# Patient Record
Sex: FEMALE | Race: WHITE | URBAN - NONMETROPOLITAN AREA
[De-identification: names, ages, dates, MRNs, and addresses within clinical notes are randomized per-mention and may not be internally consistent; named-entity substitution may affect disease eponyms.]

---

## 2018-11-28 ENCOUNTER — OPTICAL OFFICE (OUTPATIENT)
Dept: URBAN - NONMETROPOLITAN AREA CLINIC 4 | Facility: CLINIC | Age: 43
Setting detail: OPHTHALMOLOGY
End: 2018-11-28
Payer: COMMERCIAL

## 2018-11-28 ENCOUNTER — DOCTOR'S OFFICE (OUTPATIENT)
Dept: URBAN - NONMETROPOLITAN AREA CLINIC 1 | Facility: CLINIC | Age: 43
Setting detail: OPHTHALMOLOGY
End: 2018-11-28
Payer: COMMERCIAL

## 2018-11-28 DIAGNOSIS — H52.13: ICD-10-CM

## 2018-11-28 DIAGNOSIS — Z01.00: ICD-10-CM

## 2018-11-28 DIAGNOSIS — H52.223: ICD-10-CM

## 2018-11-28 PROCEDURE — 92014 COMPRE OPH EXAM EST PT 1/>: CPT | Performed by: OPTOMETRIST

## 2018-11-28 PROCEDURE — V2020 VISION SVCS FRAMES PURCHASES: HCPCS | Performed by: OPTOMETRIST

## 2018-11-28 PROCEDURE — V2107 SPHEROCYLINDER 4.25D/12-2D: HCPCS | Performed by: OPTOMETRIST

## 2018-11-28 PROCEDURE — V2103 SPHEROCYLINDR 4.00D/12-2.00D: HCPCS | Performed by: OPTOMETRIST

## 2018-11-28 ASSESSMENT — REFRACTION_MANIFEST
OS_VA2: 20/20
OD_VA3: 20/
OS_SPHERE: -4.25
OD_VA1: 20/20
OD_SPHERE: -3.75
OU_VA: 20/
OS_ADD: +1.50
OD_VA3: 20/
OS_VA3: 20/
OD_ADD: +1.50
OD_VA2: 20/
OD_AXIS: 030
OD_VA2: 20/20
OU_VA: 20/
OS_VA1: 20/20
OS_VA3: 20/
OS_VA2: 20/
OD_VA1: 20/
OD_CYLINDER: -0.75
OS_VA1: 20/
OS_AXIS: 150
OS_CYLINDER: -0.50

## 2018-11-28 ASSESSMENT — VISUAL ACUITY
OD_BCVA: 20/25
OS_BCVA: 20/25+2

## 2018-11-28 ASSESSMENT — REFRACTION_CURRENTRX
OS_OVR_VA: 20/
OD_CYLINDER: -0.25
OS_OVR_VA: 20/
OS_SPHERE: -4.25
OS_CYLINDER: SPH
OS_OVR_VA: 20/
OD_OVR_VA: 20/
OD_SPHERE: -3.25
OS_VPRISM_DIRECTION: SV
OD_OVR_VA: 20/
OD_OVR_VA: 20/
OD_VPRISM_DIRECTION: SV
OD_AXIS: 050

## 2018-11-28 ASSESSMENT — SPHEQUIV_DERIVED
OS_SPHEQUIV: -4.5
OD_SPHEQUIV: -5
OS_SPHEQUIV: -5.25
OD_SPHEQUIV: -4.125

## 2018-11-28 ASSESSMENT — REFRACTION_AUTOREFRACTION
OD_AXIS: 007
OS_AXIS: 157
OD_SPHERE: -4.50
OS_CYLINDER: -1.50
OS_SPHERE: -4.50
OD_CYLINDER: -1.00

## 2018-11-28 ASSESSMENT — CONFRONTATIONAL VISUAL FIELD TEST (CVF)
OS_FINDINGS: FULL
OD_FINDINGS: FULL

## 2023-10-03 ENCOUNTER — RX ONLY (RX ONLY)
Age: 48
End: 2023-10-03

## 2023-10-03 ENCOUNTER — DOCTOR'S OFFICE (OUTPATIENT)
Dept: URBAN - NONMETROPOLITAN AREA CLINIC 1 | Facility: CLINIC | Age: 48
Setting detail: OPHTHALMOLOGY
End: 2023-10-03
Payer: COMMERCIAL

## 2023-10-03 DIAGNOSIS — Z01.00: ICD-10-CM

## 2023-10-03 DIAGNOSIS — H52.4: ICD-10-CM

## 2023-10-03 DIAGNOSIS — H52.223: ICD-10-CM

## 2023-10-03 DIAGNOSIS — H52.13: ICD-10-CM

## 2023-10-03 PROCEDURE — 92015 DETERMINE REFRACTIVE STATE: CPT

## 2023-10-03 PROCEDURE — 92004 COMPRE OPH EXAM NEW PT 1/>: CPT

## 2023-10-03 ASSESSMENT — TONOMETRY
OD_IOP_MMHG: 18
OS_IOP_MMHG: 18

## 2023-10-03 ASSESSMENT — REFRACTION_AUTOREFRACTION
OS_CYLINDER: -0.25
OS_AXIS: 109
OD_CYLINDER: -0.75
OD_AXIS: 039
OD_SPHERE: -4.25
OS_SPHERE: -4.75

## 2023-10-03 ASSESSMENT — REFRACTION_MANIFEST
OS_CYLINDER: -0.50
OD_CYLINDER: -0.75
OS_AXIS: 130
OS_VA1: 20/20
OS_VA2: 20/20
OD_VA1: 20/20
OD_SPHERE: -4.50
OD_VA2: 20/20
OS_SPHERE: -4.75
OD_ADD: +1.50
OU_VA: 20/20
OD_AXIS: 035
OS_ADD: +1.50

## 2023-10-03 ASSESSMENT — CONFRONTATIONAL VISUAL FIELD TEST (CVF)
OS_FINDINGS: FULL
OD_FINDINGS: FULL

## 2023-10-03 ASSESSMENT — SPHEQUIV_DERIVED
OD_SPHEQUIV: -4.625
OS_SPHEQUIV: -5
OS_SPHEQUIV: -4.875
OD_SPHEQUIV: -4.875

## 2023-10-03 ASSESSMENT — VISUAL ACUITY
OS_BCVA: 20/25+2
OD_BCVA: 20/20-1

## 2023-10-03 ASSESSMENT — REFRACTION_CURRENTRX
OD_OVR_VA: 20/
OD_AXIS: 035
OS_SPHERE: -5.00
OD_VPRISM_DIRECTION: SV
OS_CYLINDER: -0.75
OD_CYLINDER: -0.75
OS_OVR_VA: 20/
OD_SPHERE: -4.75
OS_AXIS: 138
OS_VPRISM_DIRECTION: SV

## 2023-10-17 ENCOUNTER — APPOINTMENT (EMERGENCY)
Dept: RADIOLOGY | Facility: HOSPITAL | Age: 48
End: 2023-10-17
Payer: COMMERCIAL

## 2023-10-17 ENCOUNTER — HOSPITAL ENCOUNTER (EMERGENCY)
Facility: HOSPITAL | Age: 48
Discharge: HOME | End: 2023-10-17
Attending: EMERGENCY MEDICINE | Admitting: EMERGENCY MEDICINE
Payer: COMMERCIAL

## 2023-10-17 VITALS
TEMPERATURE: 97.3 F | OXYGEN SATURATION: 98 % | SYSTOLIC BLOOD PRESSURE: 143 MMHG | RESPIRATION RATE: 16 BRPM | DIASTOLIC BLOOD PRESSURE: 97 MMHG | HEART RATE: 94 BPM | WEIGHT: 182.8 LBS

## 2023-10-17 DIAGNOSIS — M54.50 LOW BACK PAIN: Primary | ICD-10-CM

## 2023-10-17 PROCEDURE — 72100 X-RAY EXAM L-S SPINE 2/3 VWS: CPT

## 2023-10-17 RX ORDER — OXYBUTYNIN CHLORIDE 5 MG/1
5 TABLET, EXTENDED RELEASE ORAL DAILY
COMMUNITY

## 2023-10-17 RX ORDER — KETOROLAC TROMETHAMINE 30 MG/ML
30 INJECTION, SOLUTION INTRAMUSCULAR; INTRAVENOUS ONCE
Status: COMPLETED | OUTPATIENT
Start: 2023-10-17 | End: 2023-10-17

## 2023-10-17 RX ORDER — LISINOPRIL 2.5 MG/1
2.5 TABLET ORAL DAILY
COMMUNITY

## 2023-10-17 RX ORDER — LEVOTHYROXINE SODIUM 0.12 MG/1
125 TABLET ORAL DAILY
COMMUNITY

## 2023-10-17 RX ADMIN — KETOROLAC TROMETHAMINE 30 MG: 30 INJECTION, SOLUTION INTRAMUSCULAR; INTRAVENOUS at 17:23

## 2023-10-17 NOTE — ED PROVIDER NOTES
History  Chief Complaint   Patient presents with    Back Pain     Patient presents to the ED with complaints of back pain that began last evening. The patient denies injury. 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 Taking?   levothyroxine 125 mcg tablet   Yes Yes   Sig: Take 125 mcg by mouth daily   lisinopril (ZESTRIL) 2.5 mg tablet   Yes Yes   Sig: Take 2.5 mg by mouth daily   oxybutynin (DITROPAN-XL) 5 mg 24 hr tablet   Yes Yes   Sig: Take 5 mg by mouth daily      Facility-Administered Medications: None       History reviewed. No pertinent past medical history. History reviewed. No pertinent surgical history. History reviewed. No pertinent family history. I have reviewed and agree with the history as documented. E-Cigarette/Vaping    E-Cigarette Use Never User      E-Cigarette/Vaping Substances     Social History     Tobacco Use    Smoking status: Every Day     Types: Cigarettes    Smokeless tobacco: Never   Vaping Use    Vaping Use: Never used   Substance Use Topics    Alcohol use: Not Currently    Drug use: Never       Review of Systems   Constitutional: Negative. HENT: Negative. Eyes: Negative. Respiratory: Negative. Cardiovascular: Negative. Gastrointestinal: Negative. Endocrine: Negative.     Genitourinary: Negative. Musculoskeletal:  Positive for back pain. Skin: Negative. Allergic/Immunologic: Negative. Neurological: Negative. Hematological: Negative. Psychiatric/Behavioral: Negative. Physical Exam  Physical Exam  Constitutional:       Appearance: She is well-developed. HENT:      Head: Normocephalic and atraumatic. Eyes:      Conjunctiva/sclera: Conjunctivae normal.      Pupils: Pupils are equal, round, and reactive to light. Cardiovascular:      Rate and Rhythm: Normal rate. Pulmonary:      Effort: Pulmonary effort is normal.   Abdominal:      Palpations: Abdomen is soft. Musculoskeletal:         General: Normal range of motion. Cervical back: Normal range of motion and neck supple. Back:       Comments: Tenderness to palpate in the right lumbar paraspinal musculature   Skin:     General: Skin is warm and dry. Neurological:      Mental Status: She is alert and oriented to person, place, and time.          Vital Signs  ED Triage Vitals [10/17/23 1632]   Temperature Pulse Respirations Blood Pressure SpO2   (!) 97.3 °F (36.3 °C) 94 16 143/97 98 %      Temp Source Heart Rate Source Patient Position - Orthostatic VS BP Location FiO2 (%)   Temporal Monitor Sitting Left arm --      Pain Score       5           Vitals:    10/17/23 1632   BP: 143/97   Pulse: 94   Patient Position - Orthostatic VS: Sitting         Visual Acuity      ED Medications  Medications   ketorolac (TORADOL) injection 30 mg (30 mg Intramuscular Given 10/17/23 1723)       Diagnostic Studies  Results Reviewed       None                   XR spine lumbar 2 or 3 views injury   ED Interpretation by Zachery Beavers DO (10/17 1707)   No acute findings                 Procedures  Procedures         ED Course  ED Course as of 10/17/23 2207   Tue Oct 17, 2023   2206 XR spine lumbar 2 or 3 views injury                               SBIRT 20yo+      Flowsheet Row Most Recent Value   Initial Alcohol Screen: US AUDIT-C     1. How often do you have a drink containing alcohol? 0 Filed at: 10/17/2023 1636   2. How many drinks containing alcohol do you have on a typical day you are drinking? 0 Filed at: 10/17/2023 1636   3b. FEMALE Any Age, or MALE 65+: How often do you have 4 or more drinks on one occassion? 0 Filed at: 10/17/2023 1636   Audit-C Score 0 Filed at: 10/17/2023 1636   JASON: How many times in the past year have you. .. Used an illegal drug or used a prescription medication for non-medical reasons? Never Filed at: 10/17/2023 1636                      Medical Decision Making  Patient presents with low back pain most likely consistent with lumbar radiculopathy. Differential diagnosis includes lumbago versus musculoskeletal spasm/sprain versus sciatica. No back pain red flags on history or physical.  Presentation not consistent with malignancy, fracture, cauda equina syndrome, AAA, viscus perforation, osteomyelitis or epidural abscess, renal colic, pyelonephritis or other infectious process. Given the clinical picture, no indication for further imaging at this time. Problems Addressed:  Low back pain: acute illness or injury    Amount and/or Complexity of Data Reviewed  Radiology: ordered and independent interpretation performed. Decision-making details documented in ED Course. Risk  OTC drugs. Prescription drug management. Disposition  Final diagnoses:   Low back pain     Time reflects when diagnosis was documented in both MDM as applicable and the Disposition within this note       Time User Action Codes Description Comment    10/17/2023  5:44 PM Jose Juan Ortega Add [M54.50] Low back pain           ED Disposition       ED Disposition   Discharge    Condition   Stable    Date/Time   Tue Oct 17, 2023 [facility] discharge to home/self care.                    Follow-up Information    None         Discharge Medication List as of 10/17/2023  5:44 PM        CONTINUE these medications which have NOT CHANGED    Details   levothyroxine 125 mcg tablet Take 125 mcg by mouth daily, Historical Med      lisinopril (ZESTRIL) 2.5 mg tablet Take 2.5 mg by mouth daily, Historical Med      oxybutynin (DITROPAN-XL) 5 mg 24 hr tablet Take 5 mg by mouth daily, Historical Med             No discharge procedures on file.     PDMP Review       None            ED Provider  Electronically Signed by             Rayo Santoyo DO  10/17/23 2256

## 2023-11-01 ENCOUNTER — OFFICE VISIT (OUTPATIENT)
Dept: PODIATRY | Age: 48
End: 2023-11-01
Payer: COMMERCIAL

## 2023-11-01 ENCOUNTER — HOSPITAL ENCOUNTER (OUTPATIENT)
Dept: RADIOLOGY | Facility: CLINIC | Age: 48
Discharge: HOME | End: 2023-11-01
Payer: COMMERCIAL

## 2023-11-01 VITALS
BODY MASS INDEX: 32.25 KG/M2 | HEIGHT: 63 IN | SYSTOLIC BLOOD PRESSURE: 120 MMHG | TEMPERATURE: 97.9 F | HEART RATE: 80 BPM | DIASTOLIC BLOOD PRESSURE: 85 MMHG | WEIGHT: 182 LBS

## 2023-11-01 DIAGNOSIS — M79.671 PAIN IN RIGHT FOOT: ICD-10-CM

## 2023-11-01 DIAGNOSIS — M76.62 ACHILLES TENDINITIS OF LEFT LOWER EXTREMITY: ICD-10-CM

## 2023-11-01 DIAGNOSIS — M79.672 PAIN IN LEFT FOOT: Primary | ICD-10-CM

## 2023-11-01 DIAGNOSIS — M79.671 PAIN OF RIGHT HEEL: ICD-10-CM

## 2023-11-01 DIAGNOSIS — M79.672 PAIN IN LEFT FOOT: ICD-10-CM

## 2023-11-01 PROCEDURE — 73630 X-RAY EXAM OF FOOT: CPT

## 2023-11-01 PROCEDURE — 99204 OFFICE O/P NEW MOD 45 MIN: CPT | Performed by: STUDENT IN AN ORGANIZED HEALTH CARE EDUCATION/TRAINING PROGRAM

## 2023-11-01 RX ORDER — MELOXICAM 7.5 MG/1
7.5 TABLET ORAL DAILY
Qty: 10 TABLET | Refills: 0 | Status: SHIPPED | OUTPATIENT
Start: 2023-11-01

## 2023-11-01 NOTE — PATIENT INSTRUCTIONS
Foot Pain Home Therapy    Stretch. Place painful foot in back with heel on ground and lean against wall. Do not lift heel off of ground. You will feel the stretch in the calf muscles and possibly the heel. Hold the stretch for 20-30 seconds. Do this at least 5-6 times per day. It is best done after exercise when your muscles are warm. Wear supportive shoes at all times. Avoid flip-flops, flat sandals, barefoot walking (never walk barefoot, even at home). Generally avoid shoes that are too flexible and bend in the arch. Your shoes should only slightly bend in the toe area, not the middle. Running sneakers are often the best choice. Open back shoe to left as needed. Supportive sneaker brands: Maikel Lipscomb, On [address] [facility], [facility], [address] [facility], [facility] (discount if mention Dr lauren)  [address]   Supportive daily shoe brands: Vionic, Orthofeet, [place] [employer]olfo, Dansko, [place], [place], [place], [address]  Supportive home shoes: Diamond Adam (recovery slides)*  Purchase over the counter topical pain creams such as Voltarin gel, biofreeze, or CBD cream - will need to apply 2-3 times per day for benefit. + deep tissue massage. Look in to over the counter shoe inserts/arch supports such as *Danharryberg arch supports. These are all available on [facility] as well as their individual website's. [facility]: Vasyli+Dananberg 1st Ray Orthotic, Medium, 1st Ray Function, Medium Density, Full-Length Insole, Heat Molding Optional, Best All Around Orthotic, Functional Biomechanical Control for Pain Relief, Black Yellow (32060) : Health & Household        Achilles Tendon Stretching Exercises    A) Standing Gastrocnemius stretch  Place hands on wall or chair. If using wall, put your hands at eye level. Step the leg you want to stretch behind you. Keep your back heel on the floor and point your toes straight ahead or slightly inward towards the heel of the opposite foot.    Bend your knee toward the wall while keeping your back leg straight. Lean toward the wall until you feel a gentle stretch in you calf of the straight leg. Don't lean so far that you feel pain. Hold for 15 seconds. Complete 3 reps. B) Standing soleus stretch  Place your hands on the wall or chair. If using wall, put your hands at eye level. Step the leg you want to stretch behind you (your back foot will need to be closer to the front foot than the above stretch). Keep your back heel on the floor and point your toes straight ahead or slightly inward towards the heel of the opposite foot. Bend both your front and back knee at the same time (may help to stick your butt out). You do not need to lean towards the wall, just bend the knees. Lean toward the wall until you feel a gentle stretch in you calf of the straight leg. Don't lean so far that you feel pain. Hold for 15 seconds. Complete 3 reps. Keep these tips and tricks in mind to get the most out of your stretching; Take your time - move slowly, whether you are deepening into a stretch or changing positions. This will limit the risk of injury & discomfort. Avoid bouncing - quick sudden movements will only worsen achilles tendon issues. Stay relaxed during stretch. Keep your heel down and toes straight ahead or slightly inward - this will allow the achilles tendon to stretch properly. Stop if you feel pain - Don't strain or force your muscle. If you feel sharp pain, stop immediately.

## 2023-11-01 NOTE — PROGRESS NOTES
Assessment/Plan:       Diagnoses and all orders for this visit:    Pain in left foot  -     XR foot 3+ vw left; Future    Pain in right foot  -     XR foot 3+ vw right; Future    Pain of right heel  -     meloxicam (Mobic) 7.5 mg tablet; Take 1 tablet (7.5 mg total) by mouth daily    Achilles tendinitis of left lower extremity  -     meloxicam (Mobic) 7.5 mg tablet; Take 1 tablet (7.5 mg total) by mouth daily    Other orders  -     Discontinue: levothyroxine (Synthroid) 25 mcg/mL SUSP; Take 25 mcg by mouth daily Pt taking 1/2 tablet daily          Imaging Reviewed at this visit (I personally reviewed/independently interpreted images and reports in PACS)  XR right and left foot WB 6v 11/1/23: No acute osseous abnormalities noted. Slight elevation of 1st metatarsal. Left, small plantar calcaneal spur. Adductovarus 4/5 toes. IMPRESSION:  Right plantar heel and Left posterior heel pain. The differential diagnosis includes plantar fasciitis, Leal's nerve entrapment, intersection syndrome of the FHL and FDL at the knot of Mumtaz, Achilles tendinitis or tendinosis, retrocalcaneal bursitis, Maryam's deformity, degenerative changes (calcaneal spurs, arthrosis of the joints of the foot), and inflammatory conditions of the ligaments and fascia of the foot and ankle. PLAN:  I reviewed clinical exam and radiographic imaging (XR) with patient in detail today. I have discussed with the patient the pathophysiology of this diagnosis and reviewed how the examination correlates with this diagnosis. I reviewed ED note from 10/17/23  I explained at length the biomechanical abnormalities leading to the significant overload of the plantar fascia and achilles tendon. I stressed the importance of proper shoe gear and OTC arch supports to reposition foot to reduce tension on soft tissue structures and give cushion. Instructions were given for conservative care which was also demonstrated during the clinical visit.  I stressed the importance of achilles tendon stretching, icing and applying voltarin gel or biofreeze (OTC) at least 3x/day. C/w HEP as instructed by PT but modify with more AT stretching   I recommend stiff bottomed sneakers/shoes (ex Asics, Vionic, New balance, Delgado, etc) for daily use and Deon Clark (recovery slides) for in home use. I advised pt to obtain OTC orthotics such as Dananberg arch supports  I ordered the addition of PT at this visit to aide in reduction of equinus and also address the plantar fascia and gastroc aponeurosis with graston technique. I rx'd short course of meloxicam today and discussed close monitoring for stomach bleed symptoms which she is to stop the medication immediately if she has such. RTC 2 months      Subjective:      Patient ID: Quiana Warner is a 51 y.o. female. Jose Manuel Krueger presents to clinic today concerning B/L foot pain in poor sneakers in August. Pain to heels and achilles region. Was doing PT, few weeks, now doing HEP. Does have inserts. Notes pain minimal to none with inserts and shoes. Does have pain to back of heels with pressure. Cannot walk barefoot. The following portions of the patient's history were reviewed and updated as appropriate: allergies, current medications, past family history, past medical history, past social history, past surgical history, and problem list.    Review of Systems   Constitutional:  Negative for activity change, chills and fever. HENT: Negative. Respiratory:  Negative for cough, chest tightness and shortness of breath. Cardiovascular:  Negative for chest pain and leg swelling. Endocrine: Negative. Genitourinary: Negative. Neurological: Negative. Negative for numbness. Psychiatric/Behavioral: Negative. Negative for agitation and behavioral problems.           Objective:      /85   Pulse 80   Temp 97.9 °F (36.6 °C)   Ht 5' 3" (1.6 m)   Wt 82.6 kg (182 lb)   BMI 32.24 kg/m²          Physical Exam  Constitutional:       Appearance: Normal appearance. She is not ill-appearing. Cardiovascular:      Pulses: Normal pulses. Comments: Bilateral DP & PT pulses 2/4. CRT WNL. Pedal hair present. Feet warm and well perfused. Pulmonary:      Effort: Pulmonary effort is normal. No respiratory distress. Musculoskeletal:         General: Tenderness (Right foot pain at plantar calcaneus at medial tubercle at insertion of plantar fascia. Left posterior calcaneal pain at insertion of achilles tendon to calc.) present. Normal range of motion. Comments: There is decreased ankle DF with knee extended and flexed indicating gastroc-soleal equinus. B/L WB exam exhibits mild collapse of MLA, heel position slight valgus. Bilateral ankle, STJ, TNJ, TMTJ, MTPJ ROM WNL and without pain. LE muscle strength WNL. Skin:     General: Skin is warm. Capillary Refill: Capillary refill takes less than 2 seconds. Findings: No erythema or lesion. Neurological:      General: No focal deficit present. Mental Status: She is alert and oriented to person, place, and time. Sensory: No sensory deficit. Comments: Gross sensation intact. Denies N/T/B to B/L feet.     Psychiatric:         Mood and Affect: Mood normal.

## 2024-01-27 ENCOUNTER — HOSPITAL ENCOUNTER (EMERGENCY)
Facility: HOSPITAL | Age: 49
Discharge: HOME | End: 2024-01-27
Attending: EMERGENCY MEDICINE
Payer: COMMERCIAL

## 2024-01-27 VITALS
RESPIRATION RATE: 17 BRPM | BODY MASS INDEX: 31.18 KG/M2 | HEIGHT: 63 IN | OXYGEN SATURATION: 98 % | SYSTOLIC BLOOD PRESSURE: 186 MMHG | WEIGHT: 176 LBS | TEMPERATURE: 97.1 F | DIASTOLIC BLOOD PRESSURE: 86 MMHG | HEART RATE: 99 BPM

## 2024-01-27 DIAGNOSIS — M54.50 ACUTE LOW BACK PAIN: Primary | ICD-10-CM

## 2024-01-27 PROCEDURE — 99284 EMERGENCY DEPT VISIT MOD MDM: CPT | Performed by: EMERGENCY MEDICINE

## 2024-01-27 PROCEDURE — 99282 EMERGENCY DEPT VISIT SF MDM: CPT

## 2024-01-27 PROCEDURE — 96372 THER/PROPH/DIAG INJ SC/IM: CPT

## 2024-01-27 RX ORDER — KETOROLAC TROMETHAMINE 30 MG/ML
60 INJECTION, SOLUTION INTRAMUSCULAR; INTRAVENOUS ONCE
Status: COMPLETED | OUTPATIENT
Start: 2024-01-27 | End: 2024-01-27

## 2024-01-27 RX ORDER — OXYCODONE HYDROCHLORIDE AND ACETAMINOPHEN 5; 325 MG/1; MG/1
1 TABLET ORAL EVERY 8 HOURS PRN
Qty: 6 TABLET | Refills: 0 | Status: SHIPPED | OUTPATIENT
Start: 2024-01-27 | End: 2024-01-29

## 2024-01-27 RX ORDER — DEXAMETHASONE SODIUM PHOSPHATE 10 MG/ML
8 INJECTION, SOLUTION INTRAMUSCULAR; INTRAVENOUS ONCE
Status: COMPLETED | OUTPATIENT
Start: 2024-01-27 | End: 2024-01-27

## 2024-01-27 RX ADMIN — KETOROLAC TROMETHAMINE 60 MG: 30 INJECTION, SOLUTION INTRAMUSCULAR; INTRAVENOUS at 14:49

## 2024-01-27 RX ADMIN — DEXAMETHASONE SODIUM PHOSPHATE 8 MG: 10 INJECTION, SOLUTION INTRAMUSCULAR; INTRAVENOUS at 14:50

## 2024-01-27 NOTE — ED PROVIDER NOTES
History  Chief Complaint   Patient presents with   • Back Pain     Pt arrives from home with c/o lower mid back pain x1 month. Pain worsened yesterday. Pt taking prescriptions and going to PT as ordered.      This is a 48-year-old female presenting to the ED for evaluation of lower back pain worsening over the past month.  Patient states that she initially injured her back on 4 January by lifting something heavy.  She was seen initially in the ED and given a shot for pain.  Her pain improved and then came back and so she was seen outpatient by her PCP who put her on a course of steroids, alternating Tylenol/Motrin and recommending physical therapy.  Patient states that she has been taking her medications as prescribed and going to physical therapy which has helped tremendously.  She states she had 3 days of no pain and then yesterday her pain worsened without any heavy lifting or movement.  She states during the course of her last physical therapy session there was an exercise move that caused her to have some pain which was stopped immediately.  She has not had any fever or chills or any bowel or bladder incontinence.  Also denies any urinary symptoms.        Prior to Admission Medications   Prescriptions Last Dose Informant Patient Reported? Taking?   levothyroxine 125 mcg tablet   Yes No   Sig: Take 125 mcg by mouth daily   lisinopril (ZESTRIL) 2.5 mg tablet   Yes No   Sig: Take 2.5 mg by mouth daily   meloxicam (Mobic) 7.5 mg tablet   No No   Sig: Take 1 tablet (7.5 mg total) by mouth daily   oxybutynin (DITROPAN-XL) 5 mg 24 hr tablet   Yes No   Sig: Take 5 mg by mouth daily      Facility-Administered Medications: None       Past Medical History:   Diagnosis Date   • Hypertension    • Thyroid disease        Past Surgical History:   Procedure Laterality Date   • HYSTERECTOMY     • KNEE SURGERY Left    • TONSILLECTOMY         History reviewed. No pertinent family history.  I have reviewed and agree with the  history as documented.    E-Cigarette/Vaping   • E-Cigarette Use Never User      E-Cigarette/Vaping Substances     Social History     Tobacco Use   • Smoking status: Every Day     Types: Cigarettes   • Smokeless tobacco: Never   Vaping Use   • Vaping status: Never Used   Substance Use Topics   • Alcohol use: Not Currently   • Drug use: Never       Review of Systems   Constitutional:  Negative for chills and fever.   HENT:  Negative for ear pain and sore throat.    Eyes:  Negative for pain and visual disturbance.   Respiratory:  Negative for cough and shortness of breath.    Cardiovascular:  Negative for chest pain and palpitations.   Gastrointestinal:  Negative for abdominal pain and vomiting.   Genitourinary:  Negative for dysuria and hematuria.   Musculoskeletal:  Positive for back pain. Negative for arthralgias.   Skin:  Negative for color change and rash.   Neurological:  Negative for seizures and syncope.   All other systems reviewed and are negative.      Physical Exam  Physical Exam  Vitals and nursing note reviewed.   Constitutional:       General: She is in acute distress.      Appearance: Normal appearance. She is well-developed and normal weight.   HENT:      Head: Normocephalic and atraumatic.      Right Ear: External ear normal.      Left Ear: External ear normal.      Nose: Nose normal. No congestion or rhinorrhea.      Mouth/Throat:      Mouth: Mucous membranes are moist.   Eyes:      General:         Right eye: No discharge.         Left eye: No discharge.      Extraocular Movements: Extraocular movements intact.      Conjunctiva/sclera: Conjunctivae normal.   Neck:      Vascular: No carotid bruit.   Cardiovascular:      Rate and Rhythm: Normal rate and regular rhythm.      Pulses: Normal pulses.      Heart sounds: Normal heart sounds. No murmur heard.  Pulmonary:      Effort: Pulmonary effort is normal. No respiratory distress.      Breath sounds: Normal breath sounds. No stridor. No wheezing,  rhonchi or rales.   Chest:      Chest wall: No tenderness.   Abdominal:      General: Abdomen is flat. Bowel sounds are normal. There is no distension.      Palpations: Abdomen is soft. There is no mass.      Tenderness: There is no abdominal tenderness. There is no right CVA tenderness, left CVA tenderness, guarding or rebound.      Hernia: No hernia is present.   Musculoskeletal:         General: No swelling, tenderness, deformity or signs of injury.      Cervical back: Normal range of motion and neck supple. No rigidity or tenderness.      Right lower leg: No edema.      Left lower leg: No edema.      Comments: Decreased range of motion of lumbar spine secondary to pain, no midline tenderness   Skin:     General: Skin is warm and dry.      Capillary Refill: Capillary refill takes less than 2 seconds.      Coloration: Skin is not jaundiced or pale.      Findings: No bruising, erythema, lesion or rash.   Neurological:      General: No focal deficit present.      Mental Status: She is alert and oriented to person, place, and time. Mental status is at baseline.      Cranial Nerves: No cranial nerve deficit.      Sensory: No sensory deficit.      Motor: No weakness.      Coordination: Coordination normal.      Gait: Gait normal.      Deep Tendon Reflexes: Reflexes normal.   Psychiatric:         Mood and Affect: Mood normal.         Behavior: Behavior normal.         Thought Content: Thought content normal.         Vital Signs  ED Triage Vitals   Temperature Pulse Respirations Blood Pressure SpO2   01/27/24 1420 01/27/24 1416 01/27/24 1416 01/27/24 1416 01/27/24 1416   (!) 97.1 °F (36.2 °C) 99 17 (!) 186/86 98 %      Temp Source Heart Rate Source Patient Position - Orthostatic VS BP Location FiO2 (%)   01/27/24 1420 01/27/24 1416 01/27/24 1416 01/27/24 1416 --   Temporal Monitor Sitting Right arm       Pain Score       01/27/24 1416       10 - Worst Possible Pain           Vitals:    01/27/24 1416   BP: (!) 186/86    Pulse: 99   Patient Position - Orthostatic VS: Sitting         Visual Acuity      ED Medications  Medications   ketorolac (TORADOL) injection 60 mg (60 mg Intramuscular Given 1/27/24 1449)   dexamethasone (PF) (DECADRON) injection 8 mg (8 mg Intramuscular Given 1/27/24 1450)       Diagnostic Studies  Results Reviewed       None                   No orders to display              Procedures  Procedures         ED Course  ED Course as of 01/27/24 1602   Sat Jan 27, 2024   1547 Patient feels improved after Toradol and Decadron.  Plan is to discharge patient home with a course of Toradol however given the fact that she is on lisinopril there is adverse reaction with use of both medications.  Patient also states that she was encouraged by her PCP to avoid NSAIDs because of possible adverse outcome on kidneys.  For this reason patient has been given 2-day course of Percocet for pain control however discussed with patient that this is a one-time treatment.  Patient also complained of some residual constipation and was recently on Ultram at the beginning of her back pain.  Possibly drug-induced constipation.  Will discharge her home with a prescription for Colace.                               SBIRT 20yo+      Flowsheet Row Most Recent Value   Initial Alcohol Screen: US AUDIT-C     1. How often do you have a drink containing alcohol? 0 Filed at: 01/27/2024 1422   2. How many drinks containing alcohol do you have on a typical day you are drinking?  0 Filed at: 01/27/2024 1422   3a. Male UNDER 65: How often do you have five or more drinks on one occasion? 0 Filed at: 01/27/2024 1422   3b. FEMALE Any Age, or MALE 65+: How often do you have 4 or more drinks on one occassion? 0 Filed at: 01/27/2024 1422   Audit-C Score 0 Filed at: 01/27/2024 1422   JASON: How many times in the past year have you...    Used an illegal drug or used a prescription medication for non-medical reasons? Never Filed at: 01/27/2024 1422                       Medical Decision Making  48-year-old female with low back pain with a duration of 1 month.  Differential diagnosis includes but not limited to: Sciatica, chronic back pain, spinal stenosis, muscle spasm/strain    Risk  Prescription drug management.             Disposition  Final diagnoses:   Acute low back pain     Time reflects when diagnosis was documented in both MDM as applicable and the Disposition within this note       Time User Action Codes Description Comment    1/27/2024  3:48 PM Carmen Simons [M54.50] Acute low back pain           ED Disposition       ED Disposition   Discharge    Condition   Stable    Date/Time   Sat Jan 27, 2024 6196    Comment   Narcisa Baca discharge to home/self care.                   Follow-up Information       Follow up With Specialties Details Why Contact Info    [place] [id number]  [phone]            Discharge Medication List as of 1/27/2024  3:50 PM        CONTINUE these medications which have NOT CHANGED    Details   levothyroxine 125 mcg tablet Take 125 mcg by mouth daily, Historical Med      lisinopril (ZESTRIL) 2.5 mg tablet Take 2.5 mg by mouth daily, Historical Med      meloxicam (Mobic) 7.5 mg tablet Take 1 tablet (7.5 mg total) by mouth daily, Starting Wed 11/1/2023, Normal      oxybutynin (DITROPAN-XL) 5 mg 24 hr tablet Take 5 mg by mouth daily, Historical Med             No discharge procedures on file.    PDMP Review       None            ED Provider  Electronically Signed by             Carmen Simons DO  01/27/24 5148

## 2024-01-27 NOTE — DISCHARGE INSTRUCTIONS
Return to the ER immediately for any worsening symptoms.  Follow-up with your PCP for further evaluation and management.  Recommend no heavy lifting

## 2024-08-06 RX ORDER — BACLOFEN 20 MG
500 TABLET ORAL
COMMUNITY

## 2024-08-06 RX ORDER — CYCLOBENZAPRINE HCL 10 MG
10 TABLET ORAL 3 TIMES DAILY PRN
COMMUNITY
Start: 2024-01-04

## 2024-08-06 RX ORDER — ALBUTEROL SULFATE 90 UG/1
2 AEROSOL, METERED RESPIRATORY (INHALATION) EVERY 4 HOURS PRN
COMMUNITY

## 2024-08-06 RX ORDER — ESTRADIOL 0.1 MG/G
2 CREAM VAGINAL
COMMUNITY
Start: 2023-11-06

## 2024-08-06 RX ORDER — ACETAMINOPHEN AND CODEINE PHOSPHATE 300; 30 MG/1; MG/1
1-2 TABLET ORAL EVERY 6 HOURS PRN
COMMUNITY
Start: 2024-03-20

## 2024-08-07 ENCOUNTER — OFFICE VISIT (OUTPATIENT)
Dept: PODIATRY | Age: 49
End: 2024-08-07
Payer: COMMERCIAL

## 2024-08-07 VITALS
HEIGHT: 63 IN | OXYGEN SATURATION: 98 % | BODY MASS INDEX: 34.16 KG/M2 | DIASTOLIC BLOOD PRESSURE: 80 MMHG | TEMPERATURE: 98.1 F | SYSTOLIC BLOOD PRESSURE: 118 MMHG | WEIGHT: 192.8 LBS | HEART RATE: 79 BPM

## 2024-08-07 DIAGNOSIS — M79.671 PAIN OF RIGHT HEEL: Primary | ICD-10-CM

## 2024-08-07 DIAGNOSIS — G89.29 CHRONIC LOW BACK PAIN, UNSPECIFIED BACK PAIN LATERALITY, UNSPECIFIED WHETHER SCIATICA PRESENT: ICD-10-CM

## 2024-08-07 DIAGNOSIS — M79.672 PAIN OF LEFT HEEL: ICD-10-CM

## 2024-08-07 DIAGNOSIS — M54.50 CHRONIC LOW BACK PAIN, UNSPECIFIED BACK PAIN LATERALITY, UNSPECIFIED WHETHER SCIATICA PRESENT: ICD-10-CM

## 2024-08-07 PROCEDURE — 99213 OFFICE O/P EST LOW 20 MIN: CPT | Performed by: STUDENT IN AN ORGANIZED HEALTH CARE EDUCATION/TRAINING PROGRAM

## 2024-08-07 RX ORDER — ACETAMINOPHEN 160 MG
2000 TABLET,DISINTEGRATING ORAL DAILY
COMMUNITY
Start: 2024-05-12

## 2024-08-07 RX ORDER — TRIAMCINOLONE ACETONIDE 1 MG/G
OINTMENT TOPICAL
COMMUNITY
Start: 2024-06-05

## 2024-08-07 RX ORDER — B-COMPLEX WITH VITAMIN C
1 TABLET ORAL DAILY
COMMUNITY
Start: 2024-08-01

## 2024-08-07 NOTE — PROGRESS NOTES
Assessment/Plan:       Diagnoses and all orders for this visit:    Pain of right heel    Pain of left heel    Chronic low back pain, unspecified back pain laterality, unspecified whether sciatica present  -     Ambulatory referral to Spine & Pain Management; Future    Other orders  -     estradiol (ESTRACE) 0.1 mg/g vaginal cream; Insert 2 g into the vagina  -     cyclobenzaprine (FLEXERIL) 10 mg tablet; Take 10 mg by mouth Three times daily as needed  -     acetaminophen-codeine (TYLENOL with CODEINE #3) 300-30 MG per tablet; Take 1-2 tablets by mouth every 6 (six) hours as needed  -     albuterol (PROVENTIL HFA,VENTOLIN HFA) 90 mcg/act inhaler; Inhale 2 puffs every 4 (four) hours as needed  -     Magnesium Oxide -Mg Supplement 500 MG TABS; Take 500 mg by mouth  -     B Complex Vitamins (Vitamin B Complex) TABS; Take 1 tablet by mouth daily  -     Cholecalciferol (Vitamin D3) 50 MCG (2000 UT) capsule; Take 2,000 Units by mouth daily  -     triamcinolone (KENALOG) 0.1 % ointment; Apply topically            Prior Imaging   XR right and left foot WB 6v 11/1/23: No acute osseous abnormalities noted. Slight elevation of 1st metatarsal. Left, small plantar calcaneal spur. Adductovarus 4/5 toes.       IMPRESSION:  B/L heel pain. The differential diagnosis includes plantar fasciitis, Leal's nerve entrapment, intersection syndrome of the FHL and FDL at the knot of Mumtaz, Achilles tendinitis or tendinosis, retrocalcaneal bursitis, Maryam's deformity, degenerative changes (calcaneal spurs, arthrosis of the joints of the foot), and inflammatory conditions of the ligaments and fascia of the foot and ankle.   Low back pain, chronic     PLAN:  Right plantar and left posterior heel pain had resolved. Now has Right posterior and left plantar heel pain as she had completed stopped stretching. She did not get new shoes or arch supports as recommended.   I again stressed the importance of daily achilles tendon stretching, proper  "shoe gear and OTC arch supports to reposition foot to reduce tension on soft tissue structures and give cushion.  I again recommend stiff bottomed sneakers/shoes (ex Asics, Vionic, New balance, Delgado, etc) for daily use and Deon Clark (recovery slides) for in home use.   I again advised pt to obtain OTC orthotics such as Dananberg arch supports  I ordered pain mngmt consult due to chronic lower back pain  F/u PRN      Subjective:      Patient ID: Narcisa Baca is a 49 y.o. female.    Narcisa presents to clinic today concerning B/L foot pain f/u. Pain resolved with stretching however she stopped and it has returned on and off. Now to right posterior and left plantar heel. Did not get new shoes nor arch supports as recommended. Has not started stretching again. Lost handout that was given.     \"Pain to heels and achilles region. Was doing PT, few weeks, now doing HEP. Does have inserts. Notes pain minimal to none with inserts and shoes. Does have pain to back of heels with pressure. Cannot walk barefoot.\"         The following portions of the patient's history were reviewed and updated as appropriate: allergies, current medications, past family history, past medical history, past social history, past surgical history, and problem list.    Review of Systems   Constitutional:  Negative for activity change, chills and fever.   HENT: Negative.     Respiratory:  Negative for cough, chest tightness and shortness of breath.    Cardiovascular:  Negative for chest pain and leg swelling.   Endocrine: Negative.    Genitourinary: Negative.    Musculoskeletal:  Positive for back pain.   Neurological: Negative.  Negative for numbness.   Psychiatric/Behavioral: Negative.  Negative for agitation and behavioral problems.          Objective:      /80   Pulse 79   Temp 98.1 °F (36.7 °C) (Temporal)   Ht 5' 3\" (1.6 m)   Wt 87.5 kg (192 lb 12.8 oz)   SpO2 98%   BMI 34.15 kg/m²          Physical Exam  Constitutional:       " Appearance: Normal appearance. She is not ill-appearing.   Cardiovascular:      Pulses: Normal pulses.      Comments: Bilateral DP & PT pulses 2/4. CRT WNL. Pedal hair present. Feet warm and well perfused.   Pulmonary:      Effort: Pulmonary effort is normal. No respiratory distress.   Musculoskeletal:         General: Tenderness (Left foot pain at plantar calcaneus at medial tubercle at insertion of plantar fascia. Right posterior calcaneal pain at insertion of achilles tendon to calc.) present. Normal range of motion.      Comments: There is decreased ankle DF with knee extended and flexed indicating gastroc-soleal equinus.   B/L WB exam exhibits mild collapse of MLA, heel position slight valgus.  Bilateral ankle, STJ, TNJ, TMTJ, MTPJ ROM WNL and without pain. LE muscle strength WNL.   Skin:     General: Skin is warm.      Capillary Refill: Capillary refill takes less than 2 seconds.      Findings: No erythema or lesion.   Neurological:      General: No focal deficit present.      Mental Status: She is alert and oriented to person, place, and time.      Sensory: No sensory deficit.      Comments: Gross sensation intact. Denies N/T/B to B/L feet.    Psychiatric:         Mood and Affect: Mood normal.

## 2024-08-07 NOTE — PATIENT INSTRUCTIONS
Foot Pain Home Therapy    Stretch. Place painful foot in back with heel on ground and lean against wall. Do not lift heel off of ground. You will feel the stretch in the calf muscles and possibly the heel. Hold the stretch for 20-30 seconds. Do this at least 5-6 times per day. It is best done after exercise when your muscles are warm.  Wear supportive shoes at all times. Avoid flip-flops, flat sandals, barefoot walking (never walk barefoot, even at home). Generally avoid shoes that are too flexible and bend in the arch. Your shoes should only slightly bend in the toe area, not the middle. Running sneakers are often the best choice. Open back shoe to left as needed.   Supportive sneaker brands: Delgado, On Cloud, Deon, New Balance, Asics, Mizuno  [place] [employer] (discount if mention Dr lauren)  Fleet Feet [place]  [facility], [place]   Supportive daily shoe brands: Vionic, Orthofeet, Liv, Dansko, Chacos, Og, Teva, Birkenstock  Supportive home shoes: Deon Clark (recovery slides)*  Purchase over the counter topical pain creams such as Voltarin gel, biofreeze, or CBD cream - will need to apply 2-3 times per day for benefit. + deep tissue massage.   Look in to over the counter shoe inserts/arch supports such as Dananberg arch supports (take tab under 1st toe). These are all available on [employer].com as well as their individual website's.   [place]: Vasyli+Dananberg 1st Ray Orthotic, Medium, 1st Ray Function, Medium Density, Full-Length Insole, Heat Molding Optional, Best All Around Orthotic, Functional Biomechanical Control for Pain Relief, Black Yellow ([id number]) : Health & Household        Achilles Tendon Stretching Exercises    A) Standing Gastrocnemius stretch  Place hands on wall or chair. If using wall, put your hands at eye level.  Step the leg you want to stretch behind you. Keep your back heel on the floor and point your toes straight ahead or slightly inward towards the heel of the opposite foot.    Bend your knee toward the wall while keeping your back leg straight.  Lean toward the wall until you feel a gentle stretch in you calf of the straight leg. Don't lean so far that you feel pain.  Hold for 15 seconds. Complete 3 reps.    B) Standing soleus stretch  Place your hands on the wall or chair. If using wall, put your hands at eye level.  Step the leg you want to stretch behind you (your back foot will need to be closer to the front foot than the above stretch). Keep your back heel on the floor and point your toes straight ahead or slightly inward towards the heel of the opposite foot.   Bend both your front and back knee at the same time (may help to stick your butt out). You do not need to lean towards the wall, just bend the knees.   Lean toward the wall until you feel a gentle stretch in you calf of the straight leg. Don't lean so far that you feel pain.  Hold for 15 seconds. Complete 3 reps.          Keep these tips and tricks in mind to get the most out of your stretching;  Take your time - move slowly, whether you are deepening into a stretch or changing positions. This will limit the risk of injury & discomfort.  Avoid bouncing - quick sudden movements will only worsen achilles tendon issues. Stay relaxed during stretch.  Keep your heel down and toes straight ahead or slightly inward - this will allow the achilles tendon to stretch properly.   Stop if you feel pain - Don't strain or force your muscle. If you feel sharp pain, stop immediately.

## 2024-09-11 RX ORDER — LISINOPRIL 10 MG/1
TABLET ORAL
COMMUNITY
Start: 2024-08-01

## 2024-09-11 RX ORDER — MECOBALAMIN 5000 MCG
TABLET,DISINTEGRATING ORAL
COMMUNITY

## 2024-09-13 ENCOUNTER — CONSULT (OUTPATIENT)
Dept: PAIN MEDICINE | Facility: CLINIC | Age: 49
End: 2024-09-13
Payer: COMMERCIAL

## 2024-09-13 VITALS
BODY MASS INDEX: 33.84 KG/M2 | WEIGHT: 191 LBS | RESPIRATION RATE: 18 BRPM | TEMPERATURE: 98.1 F | SYSTOLIC BLOOD PRESSURE: 126 MMHG | OXYGEN SATURATION: 96 % | HEIGHT: 63 IN | HEART RATE: 99 BPM | DIASTOLIC BLOOD PRESSURE: 82 MMHG

## 2024-09-13 DIAGNOSIS — M54.50 CHRONIC LOW BACK PAIN, UNSPECIFIED BACK PAIN LATERALITY, UNSPECIFIED WHETHER SCIATICA PRESENT: ICD-10-CM

## 2024-09-13 DIAGNOSIS — G89.29 CHRONIC LOW BACK PAIN, UNSPECIFIED BACK PAIN LATERALITY, UNSPECIFIED WHETHER SCIATICA PRESENT: ICD-10-CM

## 2024-09-13 DIAGNOSIS — M47.816 LUMBAR SPONDYLOSIS: Primary | ICD-10-CM

## 2024-09-13 PROCEDURE — 99204 OFFICE O/P NEW MOD 45 MIN: CPT | Performed by: ANESTHESIOLOGY

## 2024-09-13 RX ORDER — LEVOTHYROXINE SODIUM 88 UG/1
88 TABLET ORAL DAILY
COMMUNITY
Start: 2024-09-06

## 2024-09-13 NOTE — PROGRESS NOTES
Assessment  1. Chronic low back pain, unspecified back pain laterality, unspecified whether sciatica present  -     Ambulatory referral to Spine & Pain Management    Axial low back pain described primarily by arthritic features.  Aching, nagging, indolent, stabbing, throbbing features in axial low back without radicular components.  5/5 strength bilaterally in lower extremities, negative SLR.  Positive facet loading maneuvers in lumbar spine elicited pain, positive tenderness to palpation over lumbar paraspinal muscles.  Findings correlate with disc degeneration, lumbar facet arthropathy seen throughout axial low back on imaging studies.  Currently she is neurologically intact without gait instability, saddle anesthesia or bowel/bladder abnormality. Lifestyle modifications extensively discussed including diet, core exercises and weight loss in conjunction with smoking cessation. Risks, benefits and alternatives to medial branch blocks and subsequent radiofrequency ablation if successful thoroughly discussed with patient.  Handouts provided questions answered to patient satisfaction.    The patient has been experiencing moderate to severe axial spine pain that is causing functional deficit.  The pain has been present for at least 3 months and is not improving with conservative care.  Currently the patient is not experiencing any radicular features nor neurogenic claudication.  Non-facet pathology has been ruled out on clinical evaluation.    Plan  -MRI lumbar spine; will f/u result  -gabapentin 300 mg t.i.d. Ordered for patient; counseled regarding sedative effects of taking this medication and provided up titration calendar.  Counseled not to take medication while driving or operating heavy machinery/using stairs  -has completed formal physical therapy for lumbar spondylosis; Physician directed home exercise plan as per [employer] demonstrated and handouts provided that patient plans to participate with for 1 hour,  twice a week for the next 6 weeks.     There are risks associated with opioid medications, including dependence, addiction and tolerance. The patient understands and agrees to use these medications only as prescribed. Potential side effects of the medications include, but are not limited to, constipation, drowsiness, addiction, impaired judgment and risk of fatal overdose if not taken as prescribed. The patient was warned against driving while taking sedation medications or operating heavy machinery. The patient voiced understanding. Sharing medications is a felony. At this point in time, the patient is showing no signs of addiction, abuse, diversion or suicidal ideation.     [place] Prescription Drug Monitoring Program report was reviewed and was appropriate      Complete risks and benefits including bleeding, infection, tissue reaction, nerve injury and allergic reaction were discussed. The approach was demonstrated using models and literature was provided. Verbal and written consent was obtained.     My impressions and treatment recommendations were discussed in detail with the patient who verbalized understanding and had no further questions.  Discharge instructions were provided. I personally saw and examined the patient and I agree with the above discussed plan of care.    Review of external notes including PT notes, PCP notes and specialist notes was performed at this visit in addition to review of new ordered imaging and past imaging to develop or modify multidisciplinary pain plan    New Medications Ordered This Visit   Medications    Cyanocobalamin 500 MCG SUBL     Sig: Place 500 mcg under the tongue daily    Melatonin 5 MG TBDP     Sig: Take by mouth    Multiple Vitamins-Minerals (Multivitamin Adults) TABS     Sig: Take by mouth    lisinopril (ZESTRIL) 10 mg tablet    levothyroxine 88 mcg tablet     Sig: Take 88 mcg by mouth daily       History of Present Illness    Narcisa Baca is a 49 y.o.  female with pmhx of HTN, hypothyroidism, obesity, 1PPD smoker (COPD) presenting with a past medical history of chronic low back pain described primarily as arthritic in nature.  She describes 8/10 low back pain that is worse in the mornings and worse at the end of the day.  The pain is characterized by achy, nagging, indolent, crampy, stabbing pain in her axial low back.  The patient describes that the pain is worse with standing for long periods of time on hard surfaces as well as with walking.  The patient is a very active individual and feels as though this pain compromises his participation with independent activities of daily living. The pain can be debilitating at times and contribute to significant disability, compromising overall activity and independent activities of daily living.  She has completed physical therapy with limited relief of symptoms.  Medications the patient has tried in the past include nsaids, tylenol. She describes no radicular symptoms and has good strength.  She denies any weakness numbness or paresthesias. The patient denies any bowel or bladder incontinence, saddle anesthesia or gait instability as well.      I have personally reviewed and/or updated the patient's past medical history, past surgical history, family history, social history, current medications, allergies, and vital signs today.     Review of Systems   Constitutional:  Positive for activity change.   HENT: Negative.     Eyes: Negative.    Respiratory: Negative.     Cardiovascular: Negative.    Gastrointestinal: Negative.    Endocrine: Negative.    Genitourinary: Negative.    Musculoskeletal:  Positive for arthralgias, back pain, gait problem and myalgias.   Skin: Negative.    Allergic/Immunologic: Negative.    Neurological:  Negative for weakness and numbness.   Hematological: Negative.    Psychiatric/Behavioral: Negative.     All other systems reviewed and are negative.      There is no problem list on file for this  patient.      Past Medical History:   Diagnosis Date    Hypertension     Thyroid disease        Past Surgical History:   Procedure Laterality Date    HYSTERECTOMY      KNEE SURGERY Left     TONSILLECTOMY         History reviewed. No pertinent family history.    Social History     Occupational History    Not on file   Tobacco Use    Smoking status: Every Day     Current packs/day: 1.00     Types: Cigarettes    Smokeless tobacco: Never   Vaping Use    Vaping status: Never Used   Substance and Sexual Activity    Alcohol use: Not Currently    Drug use: Never    Sexual activity: Not on file       Current Outpatient Medications on File Prior to Visit   Medication Sig    albuterol (PROVENTIL HFA,VENTOLIN HFA) 90 mcg/act inhaler Inhale 2 puffs every 4 (four) hours as needed PRN    B Complex Vitamins (Vitamin B Complex) TABS Take 1 tablet by mouth daily    Cholecalciferol (Vitamin D3) 50 MCG (2000 UT) capsule Take 2,000 Units by mouth daily    Cyanocobalamin 500 MCG SUBL Place 500 mcg under the tongue daily    levothyroxine 88 mcg tablet Take 88 mcg by mouth daily    lisinopril (ZESTRIL) 10 mg tablet     Melatonin 5 MG TBDP Take by mouth    Multiple Vitamins-Minerals (Multivitamin Adults) TABS Take by mouth    oxybutynin (DITROPAN-XL) 5 mg 24 hr tablet Take 5 mg by mouth daily    triamcinolone (KENALOG) 0.1 % ointment Apply topically PRN    [DISCONTINUED] acetaminophen-codeine (TYLENOL with CODEINE #3) 300-30 MG per tablet Take 1-2 tablets by mouth every 6 (six) hours as needed (Patient not taking: Reported on 9/13/2024)    [DISCONTINUED] cyclobenzaprine (FLEXERIL) 10 mg tablet Take 10 mg by mouth Three times daily as needed (Patient not taking: Reported on 9/13/2024)    [DISCONTINUED] estradiol (ESTRACE) 0.1 mg/g vaginal cream Insert 2 g into the vagina (Patient not taking: Reported on 9/13/2024)    [DISCONTINUED] levothyroxine 125 mcg tablet Take 125 mcg by mouth daily (Patient not taking: Reported on 9/13/2024)     "[DISCONTINUED] lisinopril (ZESTRIL) 2.5 mg tablet Take 2.5 mg by mouth daily (Patient not taking: Reported on 9/13/2024)    [DISCONTINUED] Magnesium Oxide -Mg Supplement 500 MG TABS Take 500 mg by mouth (Patient not taking: Reported on 9/13/2024)    [DISCONTINUED] meloxicam (Mobic) 7.5 mg tablet Take 1 tablet (7.5 mg total) by mouth daily (Patient not taking: Reported on 9/13/2024)     No current facility-administered medications on file prior to visit.       Allergies   Allergen Reactions    Aspirin Other (See Comments)    Milk (Cow) Diarrhea    Nsaids Other (See Comments)     Bowel issues    Amoxicillin Other (See Comments) and Rash         Physical Exam    /82 (BP Location: Left arm, Patient Position: Sitting, Cuff Size: Adult)   Pulse 99   Temp 98.1 °F (36.7 °C)   Resp 18   Ht 5' 3\" (1.6 m)   Wt 86.6 kg (191 lb)   SpO2 96%   BMI 33.83 kg/m²     Constitutional: normal, well developed, well nourished, alert, in no distress and non-toxic and no overt pain behavior. and obese  Eyes: anicteric  HEENT: grossly intact  Neck: supple, symmetric, trachea midline and no masses   Pulmonary:even and unlabored  Cardiovascular:No edema or pitting edema present  Skin:Normal without rashes or lesions and well hydrated  Psychiatric:Mood and affect appropriate  Neurologic:Cranial Nerves II-XII grossly intact Sensation grossly intact; no clonus negative kendrick's. Reflexes 2+ and brisk. SLR negative bilaterally.   Musculoskeletal:normal gait. 5/5 strength bilaterally with AROM in all extremities. Normal heel toe and tip toe walking. Significant pain with lumbar facet loading bilaterally and with lateral spine rotatio, ttp over lumbar paraspinal muscles. Negative negrito's test, negative gaenslen's negative SIJ loading bilaterally.    Imaging    XR SPINE LUMBAR MINIMUM 4 VIEWS NON INJURY  Order: [id number]  Impression    IMPRESSION: No evidence of an acute or suspicious process in the lumbar spine.  Minimal degenerative " disc disease at L4-5 and L5-S1. Postsurgical changes in the  pelvis as noted.          Workstation:[id number]  Narrative    Examination: Five-view lumbar spine    Comparisons: Lumbar spine x-rays dated 5/25/2021.    INDICATION: Low back pain.    FINDINGS: 5 views of the lumbar spine demonstrate 5 lumbar-type vertebral bodies  which are symmetric in height and normally aligned. The endplates and posterior  elements appear intact throughout the lower thoracic and lumbar spine without  evidence of a fracture or suspicious osseous lesion. There is minimal  degenerative disc disease at L4-5 and L5-S1. No other arthritic changes are  identified. The visualized pelvis and hips are intact. The hip joints and SI  joints are unremarkable. Evaluation of the soft tissues again demonstrates  surgical clips in the pelvis consistent with a history of a hysterectomy.

## 2024-09-13 NOTE — PATIENT INSTRUCTIONS
Patient Education     Core Strengthening Exercises on Back or on Hands and Knees   About this topic   Your core muscles are in your chest, back, buttock, and stomach area. They are your abdominal, back, and pelvis muscles. These muscles help keep your body stable when using your arms or legs. They also help with balance and posture. There are many exercises you can do to keep these muscles strong.  If you have back problems like a compression fracture or a ruptured disc, doing some of these exercises could make your problem worse. Some of these exercises may cause lower back pain.  General   Before starting with a program, ask your doctor if you are healthy enough to do these exercises. Your doctor may have you work with a , chiropractor, or physical therapist to make a safe exercise program to meet your needs.  Strengthening Exercises   Strengthening exercises keep your muscles firm and strong. Start by repeating each exercise 2 to 3 times. Work up to doing each exercise 10 times. Try to do the exercises 2 to 3 times each day. Hold each exercise for 3 to 5 seconds. Do all exercises slowly.  Hip lifts ? Lie on your back with your knees bent and feet flat on the floor. Tighten your stomach muscles and push your heels into the floor to lift your buttocks off the floor. Relax.  Pelvic tilts ? Lie on your back with your knees bent and feet flat on the floor. Tighten your stomach muscles and press your lower back down to the floor. Relax.  Straight leg raises lying down ? Lie on your back with one leg straight. Bend your other knee so the foot is flat on the bed. Keeping your leg straight, lift the leg up to the level of your other knee. Lower it back down. Repeat with the other leg.  Knee flex lying down ? Lie on your back with both knees bent and your feet flat on the floor. Tighten your belly muscles. Raise one leg up and back down as if you are marching in slow motion. Keep belly muscles tight while you move  your leg. Switch legs. To make this exercise harder, raise both arms straight up in the air. Tighten your belly muscles. When you raise one leg up, reach the opposite arm over your head. Switch, moving the opposite arm and leg until you have done 10 repetitions on each side.  Abdominal crunches ? Lie on your back with both knees bent. Keep your feet flat on the floor. Place your hands in one of these positions. Try starting with the first position since it is the easiest. As you get better, use the other positions to make it harder.  Crunches with arms at sides.  Crunches with arms across chest.  Crunches with arms behind head. Be careful not to interlock your fingers behind your neck or head while doing crunches. This may add tension to your neck and cause strain.  Look at the ceiling. Tighten your belly muscles and lift your shoulders and upper back off the floor. Breathe out while you are doing this. Lower your shoulders to the floor. Breathe in while you are doing this. Relax your belly muscles all the way before starting another crunch.  Arm and leg lifts on hands and knees ? Start on your hands and knees. With all of these exercises, keep your back as level as possible. If you are having trouble with this, you may want to put a small object on your back such as a book. If it falls off, you are not keeping your back level enough during the exercise.  Lift one arm up to shoulder level and hold. Lower it back down. Now, lift up the other arm and hold.  Lift one leg up and kick it straight out until it is in line with your back and hold. Lower it back down. Now, lift up the other leg and hold.  Lift one arm and the OPPOSITE leg up at the same time and hold. Lower them down. Now, repeat using the other arm and leg. This is a very hard exercise. It may take time to be able to do this.               What will the results be?   Stronger core  Better balance  More toned belly and back muscles  Easier to do daily  activities  Better sports performance  Less low back pain  Helpful tips   Stay active and work out to keep your muscles strong and flexible.  Keep a healthy weight to avoid putting too much stress on your spine. Eat a healthy diet to keep your muscles healthy.  Be sure you do not hold your breath when exercising. This can raise your blood pressure. If you tend to hold your breath, try counting out loud when exercising. If any exercise bothers you, stop right away.  Try walking or cycling at an easy pace for a few minutes to warm up your muscles. Do this again after exercising.  Exercise may be slightly uncomfortable, but you should not have sharp pains. If you do get sharp pains, stop what you are doing. If the sharp pains continue, call your doctor.  Last Reviewed Date   2021-03-18  Consumer Information Use and Disclaimer   This generalized information is a limited summary of diagnosis, treatment, and/or medication information. It is not meant to be comprehensive and should be used as a tool to help the user understand and/or assess potential diagnostic and treatment options. It does NOT include all information about conditions, treatments, medications, side effects, or risks that may apply to a specific patient. It is not intended to be medical advice or a substitute for the medical advice, diagnosis, or treatment of a health care provider based on the health care provider's examination and assessment of a patient’s specific and unique circumstances. Patients must speak with a health care provider for complete information about their health, medical questions, and treatment options, including any risks or benefits regarding use of medications. This information does not endorse any treatments or medications as safe, effective, or approved for treating a specific patient. [employer] and its affiliates disclaim any warranty or liability relating to this information or the use thereof. The use of this information  is governed by the Terms of Use, available at https://www.woltersFlipzuuwer.com/en/know/clinical-effectiveness-terms   Copyright   Copyright © 2024 [employer] and its affiliates and/or licensors. All rights reserved.

## 2024-09-17 DIAGNOSIS — Z00.6 ENCOUNTER FOR EXAMINATION FOR NORMAL COMPARISON OR CONTROL IN CLINICAL RESEARCH PROGRAM: ICD-10-CM

## 2024-09-25 ENCOUNTER — APPOINTMENT (OUTPATIENT)
Dept: LAB | Facility: CLINIC | Age: 49
End: 2024-09-25

## 2024-09-25 ENCOUNTER — TELEPHONE (OUTPATIENT)
Age: 49
End: 2024-09-25

## 2024-09-25 DIAGNOSIS — Z00.6 ENCOUNTER FOR EXAMINATION FOR NORMAL COMPARISON OR CONTROL IN CLINICAL RESEARCH PROGRAM: ICD-10-CM

## 2024-09-25 DIAGNOSIS — M47.816 LUMBAR SPONDYLOSIS: Primary | ICD-10-CM

## 2024-09-25 PROCEDURE — 36415 COLL VENOUS BLD VENIPUNCTURE: CPT

## 2024-09-25 RX ORDER — GABAPENTIN 300 MG/1
300 CAPSULE ORAL 3 TIMES DAILY
Qty: 90 CAPSULE | Refills: 2 | Status: SHIPPED | OUTPATIENT
Start: 2024-09-25

## 2024-09-25 NOTE — TELEPHONE ENCOUNTER
Caller: Patient     Doctor: Shonna     Reason for call: Was to received Gabapentin after last appointment but her pharmacy never received it.    She is asking that it be sent to: [facility]/pharmacy #[id number] - [place], [place] - [address]     Call back#: [phone]

## 2024-09-30 ENCOUNTER — TELEPHONE (OUTPATIENT)
Age: 49
End: 2024-09-30

## 2024-09-30 DIAGNOSIS — M47.816 LUMBAR SPONDYLOSIS: Primary | ICD-10-CM

## 2024-09-30 NOTE — TELEPHONE ENCOUNTER
S/w the patient and reviewed that MRI was denied. She stated she has not completed PT for about a year. Reviewed that in order to have the MRI approved that she will probably need to do at least 6 weeks of PT. She stated that would be fine but so far her pain is a lot better with taking the Gabapentin. She stated she is on her fourth day and she feels a lot better. She wanted to know if she could hold off on the MRI or what do you think? Start PT? Please advise. Thanks

## 2024-09-30 NOTE — TELEPHONE ENCOUNTER
"Caller: jamey Brewster    Doctor: Shonna    Reason for call: pt returning a \"nurse call\" after speaking with her we figured out someone called about her MRI being denied.  Pt stated it was denied because of not having PT.  Pt stated she did have 2 visits of PT about a year or so ago.  Pt was advised to have those office visit notes faxed to the office.  Not sure if these can be used to appeal the denial of the MRI    Call back#: [phone]  "

## 2024-10-01 NOTE — TELEPHONE ENCOUNTER
Caller: Narcisa    Doctor: Shonna    Reason for call: patient wants to know if Gabapentin is ok to take with OTC antihistamine?    Call back#: [phone]

## 2024-10-08 LAB
APOB+LDLR+PCSK9 GENE MUT ANL BLD/T: NOT DETECTED
BRCA1+BRCA2 DEL+DUP + FULL MUT ANL BLD/T: NOT DETECTED
MLH1+MSH2+MSH6+PMS2 GN DEL+DUP+FUL M: NOT DETECTED

## 2024-10-11 ENCOUNTER — DOCTOR'S OFFICE (OUTPATIENT)
Dept: URBAN - NONMETROPOLITAN AREA CLINIC 1 | Facility: CLINIC | Age: 49
Setting detail: OPHTHALMOLOGY
End: 2024-10-11
Payer: COMMERCIAL

## 2024-10-11 DIAGNOSIS — H52.223: ICD-10-CM

## 2024-10-11 DIAGNOSIS — H52.4: ICD-10-CM

## 2024-10-11 DIAGNOSIS — H52.13: ICD-10-CM

## 2024-10-11 PROCEDURE — 92015 DETERMINE REFRACTIVE STATE: CPT

## 2024-10-11 PROCEDURE — 92014 COMPRE OPH EXAM EST PT 1/>: CPT

## 2024-10-11 ASSESSMENT — REFRACTION_MANIFEST
OS_AXIS: 135
OS_ADD: +1.50
OD_ADD: +1.50
OU_VA: 20/20
OD_VA2: 20/20
OS_VA2: 20/20
OS_CYLINDER: -0.50
OD_SPHERE: -4.50
OD_CYLINDER: -0.75
OS_VA1: 20/20
OS_SPHERE: -4.75
OD_AXIS: 180
OD_VA1: 20/20

## 2024-10-11 ASSESSMENT — REFRACTION_AUTOREFRACTION
OS_SPHERE: -4.75
OD_SPHERE: -5.00
OD_CYLINDER: -0.25
OS_CYLINDER: -0.25
OD_AXIS: 31
OS_AXIS: 119

## 2024-10-11 ASSESSMENT — CONFRONTATIONAL VISUAL FIELD TEST (CVF)
OD_FINDINGS: FULL
OS_FINDINGS: FULL

## 2024-10-11 ASSESSMENT — TONOMETRY
OD_IOP_MMHG: 18
OS_IOP_MMHG: 18

## 2024-10-11 ASSESSMENT — REFRACTION_CURRENTRX
OD_CYLINDER: -0.75
OD_VPRISM_DIRECTION: SV
OS_AXIS: 122
OS_CYLINDER: -0.50
OS_VPRISM_DIRECTION: SV
OS_OVR_VA: 20/
OD_SPHERE: -4.50
OS_SPHERE: -4.75
OD_OVR_VA: 20/
OD_AXIS: 35

## 2024-10-11 ASSESSMENT — VISUAL ACUITY
OD_BCVA: 20/20
OS_BCVA: 20/20-2

## 2025-02-15 DIAGNOSIS — M47.816 LUMBAR SPONDYLOSIS: ICD-10-CM

## 2025-02-16 RX ORDER — GABAPENTIN 300 MG/1
300 CAPSULE ORAL 3 TIMES DAILY
Qty: 90 CAPSULE | Refills: 2 | Status: SHIPPED | OUTPATIENT
Start: 2025-02-16

## 2025-05-18 DIAGNOSIS — M47.816 LUMBAR SPONDYLOSIS: ICD-10-CM

## 2025-05-19 RX ORDER — GABAPENTIN 300 MG/1
300 CAPSULE ORAL 3 TIMES DAILY
Qty: 90 CAPSULE | Refills: 2 | Status: SHIPPED | OUTPATIENT
Start: 2025-05-19